# Patient Record
Sex: FEMALE | Race: BLACK OR AFRICAN AMERICAN | NOT HISPANIC OR LATINO
[De-identification: names, ages, dates, MRNs, and addresses within clinical notes are randomized per-mention and may not be internally consistent; named-entity substitution may affect disease eponyms.]

---

## 2022-06-08 PROBLEM — Z00.00 ENCOUNTER FOR PREVENTIVE HEALTH EXAMINATION: Status: ACTIVE | Noted: 2022-06-08

## 2022-06-09 ENCOUNTER — APPOINTMENT (OUTPATIENT)
Dept: ORTHOPEDIC SURGERY | Facility: CLINIC | Age: 72
End: 2022-06-09
Payer: MEDICAID

## 2022-06-09 VITALS — WEIGHT: 185 LBS | HEIGHT: 60 IN | BODY MASS INDEX: 36.32 KG/M2

## 2022-06-09 DIAGNOSIS — I10 ESSENTIAL (PRIMARY) HYPERTENSION: ICD-10-CM

## 2022-06-09 DIAGNOSIS — E78.00 PURE HYPERCHOLESTEROLEMIA, UNSPECIFIED: ICD-10-CM

## 2022-06-09 DIAGNOSIS — E11.9 TYPE 2 DIABETES MELLITUS W/OUT COMPLICATIONS: ICD-10-CM

## 2022-06-09 PROCEDURE — 99204 OFFICE O/P NEW MOD 45 MIN: CPT

## 2022-06-09 NOTE — HISTORY OF PRESENT ILLNESS
[Lower back] : lower back [Radiating] : radiating [Tingling] : tingling [Frequent] : frequent [de-identified] : Has back pain for over 10 years. Sometimes more severe. Gets some pain and numbness in right thigh to knee. No left leg pain. No bowel or bladder changes. 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 She feels that the pain has been getting worse. Dayna saw a doctor in [facility] and was given an mri.  [FreeTextEntry7] : into right leg  [FreeTextEntry9] : lying down  [de-identified] : mri

## 2022-06-09 NOTE — PHYSICAL EXAM
[Flexion] : flexion [Bending to left] : bending to left [Bending to right] : bending to right [] : light touch intact throughout both lower extremities [Absent] : patella reflex absent

## 2022-06-15 ENCOUNTER — OUTPATIENT (OUTPATIENT)
Dept: OUTPATIENT SERVICES | Facility: HOSPITAL | Age: 72
LOS: 1 days | End: 2022-06-15
Payer: MEDICAID

## 2022-06-15 ENCOUNTER — APPOINTMENT (OUTPATIENT)
Dept: RADIOLOGY | Facility: CLINIC | Age: 72
End: 2022-06-15

## 2022-06-15 DIAGNOSIS — Z00.8 ENCOUNTER FOR OTHER GENERAL EXAMINATION: ICD-10-CM

## 2022-06-15 PROCEDURE — 77080 DXA BONE DENSITY AXIAL: CPT | Mod: 26

## 2022-06-15 PROCEDURE — 71046 X-RAY EXAM CHEST 2 VIEWS: CPT

## 2022-06-15 PROCEDURE — 77080 DXA BONE DENSITY AXIAL: CPT

## 2022-06-15 PROCEDURE — 71046 X-RAY EXAM CHEST 2 VIEWS: CPT | Mod: 26

## 2022-07-21 ENCOUNTER — APPOINTMENT (OUTPATIENT)
Dept: ORTHOPEDIC SURGERY | Facility: CLINIC | Age: 72
End: 2022-07-21

## 2022-07-21 VITALS — HEIGHT: 60 IN | BODY MASS INDEX: 35.93 KG/M2 | WEIGHT: 183 LBS

## 2022-07-21 DIAGNOSIS — M51.36 OTHER INTERVERTEBRAL DISC DEGENERATION, LUMBAR REGION: ICD-10-CM

## 2022-07-21 DIAGNOSIS — M54.16 RADICULOPATHY, LUMBAR REGION: ICD-10-CM

## 2022-07-21 PROCEDURE — 99213 OFFICE O/P EST LOW 20 MIN: CPT

## 2022-07-21 NOTE — DATA REVIEWED
[MRI] : MRI [Lumbar Spine] : lumbar spine [I reviewed the films/CD] : I reviewed the films/CD [FreeTextEntry1] : lumbar ddd, facet arthropathy, mild stenosis

## 2022-07-21 NOTE — PHYSICAL EXAM
[Absent] : patella reflex absent [] : negative sitting straight leg raise [FreeTextEntry8] : improved

## 2022-07-21 NOTE — HISTORY OF PRESENT ILLNESS
[6] : 6 [0] : 0 [Household chores] : household chores [Leisure] : leisure [Rest] : rest [Bending forward] : bending forward [Retired] : Work status: retired [Lower back] : lower back [Radiating] : radiating [Tingling] : tingling [Frequent] : frequent [de-identified] : Has back pain for over 10 years. Sometimes more severe. Gets some pain and numbness in right thigh to knee. No left leg pain. No bowel or bladder changes. 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 She feels that the pain has been getting worse. Dayna saw a doctor in [facility] and was given an mri.  [FreeTextEntry7] : into right leg  [FreeTextEntry9] : lying down  [de-identified] : mri  [de-identified] : Walking

## 2023-01-01 NOTE — DATA REVIEWED
[MRI] : MRI [Lumbar Spine] : lumbar spine [I reviewed the films/CD] : I reviewed the films/CD [FreeTextEntry1] : lumbar ddd, facet arthropathy, mild stenosis Statement Selected

## 2023-04-21 ENCOUNTER — APPOINTMENT (OUTPATIENT)
Dept: ORTHOPEDIC SURGERY | Facility: CLINIC | Age: 73
End: 2023-04-21
Payer: MEDICAID

## 2023-04-21 DIAGNOSIS — M65.332 TRIGGER FINGER, LEFT MIDDLE FINGER: ICD-10-CM

## 2023-04-21 DIAGNOSIS — M65.331 TRIGGER FINGER, RIGHT MIDDLE FINGER: ICD-10-CM

## 2023-04-21 PROCEDURE — 99214 OFFICE O/P EST MOD 30 MIN: CPT | Mod: 25

## 2023-04-21 PROCEDURE — 73130 X-RAY EXAM OF HAND: CPT | Mod: 50

## 2023-04-21 PROCEDURE — 20550 NJX 1 TENDON SHEATH/LIGAMENT: CPT | Mod: 50

## 2023-04-21 NOTE — HISTORY OF PRESENT ILLNESS
[8] : 8 [1] : 2 [Tightness] : tightness [Tingling] : tingling [Intermittent] : intermittent [de-identified] : 4/21/23: 71yo RHD female (retired) presents for BILATERAL middle finger pain x 2 months. Also c/o intermittent tingling of tips of all digits of BILATERAL hands x 2 months.\par Denies injury.\par \par Reports DM is well controlled.\par Hx: NIDDM. HTN. [] : no [FreeTextEntry5] : No reported injury, pain has been on and off for the past 2 months. She states it travels into her hand and her other fingers are tingling. Denies Numbness.

## 2023-04-21 NOTE — ASSESSMENT
[FreeTextEntry1] : The condition was explained to the patient.\par \par Possible CTS causing BILATERAL hand tingling. \par - recommend bilateral carpal tunnel night splint x 4-6 weeks.\par \par Discussed risks and benefits of treatment options for tenosynovitis - activity modification, NSAID, splint, steroid injection, or surgery.\par Patient would like to proceed with CSI for trigger finger.\par - Discussed risks, benefits, and alternatives as well as contents of injection. Risks include, but are not limited allergic reaction, flare reaction, injection site pain, bruising, numbness, increased blood sugar, skin discoloration, fat atrophy, tendon rupture, and infection. Risk of immune suppression and increased susceptibility to infection with steroid use. We discussed that too many injections may lead to weakening of the tendon and tendon rupture. Patient expressed understanding and would like to proceed with injection.\par - The skin over the LEFT middle finger A1 pulley was cleansed with alcohol and anesthetized with ethyl chloride. The flexor sheath was injected with 3mg of celestone, 0.5cc of 1% lidocaine. Site was dressed with a band-aid. Patient tolerated the procedure well.\par - The skin over the RIGHT middle finger A1 pulley was cleansed with alcohol and anesthetized with ethyl chloride. The flexor sheath was injected with 3mg of celestone, 0.5cc of 1% lidocaine. Site was dressed with a band-aid. Patient tolerated the procedure well.\par - discussed that it may take up to 1 week for symptoms to improve after CSI.\par \par F/u if symptoms persist.

## 2023-04-21 NOTE — IMAGING
[de-identified] : LEFT HAND\par skin intact. no swelling.\par TTP to MF A1 pulley.\par good EPL, FPL. good finger extension, flex to full fist. good finger abduction and adduction. \par SILT median, ulnar, radial distributions.\par palpable radial pulse, brisk cap refill all digits.\par APB 5-/5.\par + locking of MF.\par negative Tinel's at carpal tunnel.\par \par \par RIGHT HAND\par skin intact. no swelling.\par TTP to MF A1 pulley.\par good EPL, FPL. good finger extension, flex to full fist. good finger abduction and adduction. \par SILT median, ulnar, radial distributions.\par palpable radial pulse, brisk cap refill all digits.\par APB 4+/5.\par no triggering.\par negative Tinel's at carpal tunnel.\par \par \par XRAYS OF LEFT HAND: no acute displaced fracture or dislocation. djd of DIPJ of index, middle, ring, small fingers. LT coalition.\par XRAYS OF RIGHT HAND: no acute displaced fracture or dislocation. djd of DIPJ of index, middle, ring, small fingers and IPJ of thumb.

## 2023-10-23 ENCOUNTER — APPOINTMENT (OUTPATIENT)
Dept: OTOLARYNGOLOGY | Facility: CLINIC | Age: 73
End: 2023-10-23
Payer: MEDICAID

## 2023-10-23 VITALS
WEIGHT: 186 LBS | OXYGEN SATURATION: 100 % | HEIGHT: 63 IN | SYSTOLIC BLOOD PRESSURE: 116 MMHG | BODY MASS INDEX: 32.96 KG/M2 | HEART RATE: 67 BPM | DIASTOLIC BLOOD PRESSURE: 76 MMHG

## 2023-10-23 DIAGNOSIS — R22.1 LOCALIZED SWELLING, MASS AND LUMP, NECK: ICD-10-CM

## 2023-10-23 DIAGNOSIS — Z83.3 FAMILY HISTORY OF DIABETES MELLITUS: ICD-10-CM

## 2023-10-23 DIAGNOSIS — Z78.9 OTHER SPECIFIED HEALTH STATUS: ICD-10-CM

## 2023-10-23 DIAGNOSIS — Z82.3 FAMILY HISTORY OF STROKE: ICD-10-CM

## 2023-10-23 PROCEDURE — 31575 DIAGNOSTIC LARYNGOSCOPY: CPT

## 2023-10-23 PROCEDURE — 99204 OFFICE O/P NEW MOD 45 MIN: CPT | Mod: 25

## 2023-10-23 RX ORDER — NAPROXEN 500 MG/1
TABLET ORAL
Refills: 0 | Status: ACTIVE | COMMUNITY

## 2023-10-23 RX ORDER — FAMOTIDINE 20 MG/1
20 TABLET, FILM COATED ORAL
Refills: 0 | Status: ACTIVE | COMMUNITY

## 2023-10-23 RX ORDER — GABAPENTIN 100 MG
100 TABLET ORAL
Refills: 0 | Status: ACTIVE | COMMUNITY

## 2023-10-23 RX ORDER — ACETAMINOPHEN 500 MG/1
TABLET ORAL
Refills: 0 | Status: ACTIVE | COMMUNITY

## 2023-10-23 RX ORDER — TOPIRAMATE 50 MG/1
50 TABLET, FILM COATED ORAL
Refills: 0 | Status: ACTIVE | COMMUNITY

## 2023-10-23 RX ORDER — AMLODIPINE BESYLATE 10 MG/1
10 TABLET ORAL
Refills: 0 | Status: ACTIVE | COMMUNITY

## 2023-10-23 RX ORDER — METFORMIN HYDROCHLORIDE 500 MG/1
500 TABLET, COATED ORAL
Refills: 0 | Status: ACTIVE | COMMUNITY

## 2023-10-23 RX ORDER — APIXABAN 5 MG/1
5 TABLET, FILM COATED ORAL
Refills: 0 | Status: ACTIVE | COMMUNITY

## 2023-10-23 RX ORDER — LOSARTAN POTASSIUM 50 MG/1
50 TABLET, FILM COATED ORAL
Refills: 0 | Status: ACTIVE | COMMUNITY

## 2023-10-23 RX ORDER — METOPROLOL SUCCINATE 25 MG/1
25 TABLET, EXTENDED RELEASE ORAL
Refills: 0 | Status: ACTIVE | COMMUNITY

## 2023-10-23 RX ORDER — ATORVASTATIN CALCIUM 20 MG/1
20 TABLET, FILM COATED ORAL
Refills: 0 | Status: ACTIVE | COMMUNITY

## 2023-10-25 ENCOUNTER — RESULT REVIEW (OUTPATIENT)
Age: 73
End: 2023-10-25

## 2023-11-01 ENCOUNTER — APPOINTMENT (OUTPATIENT)
Dept: MRI IMAGING | Facility: CLINIC | Age: 73
End: 2023-11-01
Payer: MEDICAID

## 2023-11-01 ENCOUNTER — OUTPATIENT (OUTPATIENT)
Dept: OUTPATIENT SERVICES | Facility: HOSPITAL | Age: 73
LOS: 1 days | End: 2023-11-01
Payer: MEDICAID

## 2023-11-01 DIAGNOSIS — R22.1 LOCALIZED SWELLING, MASS AND LUMP, NECK: ICD-10-CM

## 2023-11-01 PROCEDURE — 70543 MRI ORBT/FAC/NCK W/O &W/DYE: CPT | Mod: 26

## 2023-11-01 PROCEDURE — 70543 MRI ORBT/FAC/NCK W/O &W/DYE: CPT

## 2023-11-01 PROCEDURE — A9585: CPT

## 2023-11-09 LAB
METANEPH 24H UR-MRATE: 129 UG/24 HR
METANEPHS 24H UR-MCNC: 43 UG/L
NORMETANEPHRINE 24 HR URINE: 261 UG/24 HR
NORMETANEPHRINE 24H UR-MCNC: 87 UG/L

## 2023-11-13 ENCOUNTER — APPOINTMENT (OUTPATIENT)
Dept: OTOLARYNGOLOGY | Facility: CLINIC | Age: 73
End: 2023-11-13
Payer: MEDICAID

## 2023-11-13 VITALS
HEIGHT: 62 IN | SYSTOLIC BLOOD PRESSURE: 124 MMHG | OXYGEN SATURATION: 99 % | BODY MASS INDEX: 33.49 KG/M2 | HEART RATE: 80 BPM | DIASTOLIC BLOOD PRESSURE: 82 MMHG | WEIGHT: 182 LBS

## 2023-11-13 LAB
DOPAMINE UR-MCNC: 116 UG/L
DOPAMINE, UR, 24HR: 348 UG/24 HR
EPINEPH UR-MCNC: 1 UG/L
EPINEPHRINE, U, 24HR: 3 UG/24 HR
NOREPINEPH UR-MCNC: 14 UG/L
NOREPINEPHRINE,U,24H: 42 UG/24 HR

## 2023-11-13 PROCEDURE — 99214 OFFICE O/P EST MOD 30 MIN: CPT | Mod: 25

## 2023-11-13 PROCEDURE — 31575 DIAGNOSTIC LARYNGOSCOPY: CPT

## 2024-05-13 ENCOUNTER — APPOINTMENT (OUTPATIENT)
Dept: OTOLARYNGOLOGY | Facility: CLINIC | Age: 74
End: 2024-05-13
Payer: MEDICAID

## 2024-05-13 VITALS
WEIGHT: 185 LBS | HEART RATE: 79 BPM | BODY MASS INDEX: 34.04 KG/M2 | OXYGEN SATURATION: 97 % | SYSTOLIC BLOOD PRESSURE: 127 MMHG | DIASTOLIC BLOOD PRESSURE: 74 MMHG | HEIGHT: 62 IN

## 2024-05-13 PROCEDURE — 99214 OFFICE O/P EST MOD 30 MIN: CPT | Mod: 25

## 2024-05-13 PROCEDURE — 31575 DIAGNOSTIC LARYNGOSCOPY: CPT

## 2024-05-16 NOTE — HISTORY OF PRESENT ILLNESS
[de-identified] : 73 year old female presents for follow up of an incidental finding of a neck mass on MRI durring a recent hospitalization at [facility] after fainting due to hypotension. CT angio was performed which detected a 5mm enhancing lesion between the left proximal ECA and ICA c/f carotid body tumor (paraganglioma).  An updated MRI was performed on 11/2/23 which did not indicate any findings of a mass or lymphadenopathy. 24 Hr urine was sent all WDL.  Reports she is having issues with her voice for the past 6 months--unable to hold a note while singing and frequent vocal hoarseness.  Denies complete loss of voice or pain while speaking.  Reports intermittent dysphagia with liquids(mainly water) and intermittent globus sensation.  Patient denies throat pain, odynophagia, dyspnea, hemoptysis, otalgia, recent fevers/infections, chills, night sweats, weight loss.   24hr urine collection Metanephrine- 129 ug/24 hr  Normetanephrine- 261 ug/24 hr  Urine: Metanephrine-  43 ug/l Normetanephrine- 87 ug/l  Tolerating regular diet. No dysphagia. Does report intermittent hoarseness for last 4 months increasing in frequency, also with globus sensation but improved. No trouble breathing. No neck pain or swelling.  No unexplained weight loss.  No smoking or alcohol history.  11/2023: Neck MRI w/ IVC: No neck mass or lymphadenopathy

## 2024-05-16 NOTE — ASSESSMENT
[FreeTextEntry1] : 73 year old female presents for follow up of a Neck mass on CT angio was performed which detected a 5mm enhancing lesion between the left proximal ECA and ICA c/f carotid body tumor (paraganglioma)    -new mild dysphonia, most likely due to voice strain -- good bilateral TVF motion  --will monitor for resolution -Physical and fiberoptic exam reassuring, reenforced to pt and her   -Follow up in 6 months or sooner if any problems arise  -They are in agreement with this plan.

## 2024-11-11 ENCOUNTER — APPOINTMENT (OUTPATIENT)
Dept: OTOLARYNGOLOGY | Facility: CLINIC | Age: 74
End: 2024-11-11

## 2025-08-25 ENCOUNTER — NON-APPOINTMENT (OUTPATIENT)
Age: 75
End: 2025-08-25

## 2025-08-26 ENCOUNTER — NON-APPOINTMENT (OUTPATIENT)
Age: 75
End: 2025-08-26

## 2025-08-26 ENCOUNTER — APPOINTMENT (OUTPATIENT)
Dept: NEUROSURGERY | Facility: CLINIC | Age: 75
End: 2025-08-26
Payer: MEDICARE

## 2025-08-26 VITALS
SYSTOLIC BLOOD PRESSURE: 139 MMHG | DIASTOLIC BLOOD PRESSURE: 77 MMHG | WEIGHT: 185 LBS | HEART RATE: 66 BPM | HEIGHT: 62 IN | OXYGEN SATURATION: 96 % | BODY MASS INDEX: 34.04 KG/M2

## 2025-08-26 DIAGNOSIS — Q28.3 OTHER MALFORMATIONS OF CEREBRAL VESSELS: ICD-10-CM

## 2025-08-26 PROCEDURE — 99204 OFFICE O/P NEW MOD 45 MIN: CPT
